# Patient Record
Sex: FEMALE | Race: ASIAN | NOT HISPANIC OR LATINO | URBAN - METROPOLITAN AREA
[De-identification: names, ages, dates, MRNs, and addresses within clinical notes are randomized per-mention and may not be internally consistent; named-entity substitution may affect disease eponyms.]

---

## 2022-12-09 ENCOUNTER — OFFICE (OUTPATIENT)
Dept: URBAN - METROPOLITAN AREA CLINIC 105 | Facility: CLINIC | Age: 42
End: 2022-12-09

## 2022-12-09 VITALS
HEIGHT: 64 IN | OXYGEN SATURATION: 98 % | HEART RATE: 85 BPM | SYSTOLIC BLOOD PRESSURE: 151 MMHG | DIASTOLIC BLOOD PRESSURE: 84 MMHG | WEIGHT: 172 LBS

## 2022-12-09 DIAGNOSIS — R10.12 LEFT UPPER QUADRANT PAIN: ICD-10-CM

## 2022-12-09 DIAGNOSIS — K21.9 GASTRO-ESOPHAGEAL REFLUX DISEASE WITHOUT ESOPHAGITIS: ICD-10-CM

## 2022-12-09 DIAGNOSIS — R93.3 ABNORMAL FINDINGS ON DIAGNOSTIC IMAGING OF OTHER PARTS OF DI: ICD-10-CM

## 2022-12-09 PROCEDURE — 99204 OFFICE O/P NEW MOD 45 MIN: CPT

## 2022-12-09 RX ORDER — SODIUM SULFATE, MAGNESIUM SULFATE, AND POTASSIUM CHLORIDE 17.75; 2.7; 2.25 G/1; G/1; G/1
TABLET ORAL
Qty: 1 | Refills: 0 | Status: ACTIVE
Start: 2022-12-09

## 2022-12-09 RX ORDER — OMEPRAZOLE 40 MG/1
CAPSULE, DELAYED RELEASE ORAL
Qty: 60 | Refills: 0 | Status: ACTIVE

## 2022-12-09 NOTE — SERVICEHPINOTES
Patient is a 42-year-old woman who presents for evaluation of left upper quadrant abdominal/flank pain. She reports first having this pain around her left side in 2018. At that time she was having burning sensation around her ribs. Her pain progressed and till it was occurring 24/7. Her pain became unbearable and was increasing in severity. She has CT abdomen/pelvis which showed inflammation in her stomach. She states that she changed her diet and started yoga which helped with her symptoms. Also is taking turmeric and fennel seed supplements. Symptoms worsened with turning, changing position and bending. Sometimes she has worsening symptoms with spicy food or greasy foods. She denies blood in stool or black stool. She does not take any blood thinners. She denies nausea/vomiting. She previously had heartburn and acid reflux but this improved with dietary changes. She denies dysphagia. She previously had odynophagia but this is resolved. She also has burning in her tongue after her COVID-vaccine. She denies family history of colon cancer, celiac disease or inflammatory bowel disease.br
brPatient was previously seen by Dr. Villatoro–GI 5/2017 for GERD and atypical chest pain. She had reportedly had a negative cardiac evaluation. She had tried over-the-counter and prescription acid suppression medications with good results. Symptoms have recurred when off those medications. Also with nausea and burning sensation in her chest. She had an EGD 6/2017 that was normal.Reviewed labs from 1/2022. Patient had H. pylori breath test that was negative. Ferritin 31, TIBC 293, iron 46 and percent sat 16. Folate and B12 within normal limits. CBC normal. CMP with glucose 110 but otherwise normal. Thyroid studies normal. Patient has CT abdomen/pelvis 9/2022 that showed circumferential mucosal thickening with enhancement of the antrum, 4 mm noncalcified pulmonary nodule of the left lung is unchanged since 2018 and benign and 7 mm left renal cyst. She had a CT chest without contrast 9/2022 with 4 mm noncalcified pulmonary nodule that is stable and benign. Patient was seen by her PCP Dr. Gong–9/2022 for bilateral flank pain that began on the right side but is worsening. Also with bubbling in the left upper quadrant. Some improvement with eating fennel seeds. Pain is mostly in the left upper quadrant and goes around torso. Also with increase in belching and progressively worsening symptoms over the last 1.5 years. Symptoms are worse with eating. Repeat CBC and CMP 9/2022 were normal.

## 2022-12-20 ENCOUNTER — OFFICE (OUTPATIENT)
Dept: URBAN - METROPOLITAN AREA PATHOLOGY 24 | Facility: PATHOLOGY | Age: 42
End: 2022-12-20

## 2022-12-20 ENCOUNTER — AMBULATORY SURGICAL CENTER (OUTPATIENT)
Dept: URBAN - METROPOLITAN AREA SURGERY 26 | Facility: SURGERY | Age: 42
End: 2022-12-20

## 2022-12-20 DIAGNOSIS — R10.12 LEFT UPPER QUADRANT PAIN: ICD-10-CM

## 2022-12-20 DIAGNOSIS — K29.50 UNSPECIFIED CHRONIC GASTRITIS WITHOUT BLEEDING: ICD-10-CM

## 2022-12-20 PROCEDURE — 43239 EGD BIOPSY SINGLE/MULTIPLE: CPT

## 2022-12-20 PROCEDURE — 88305 TISSUE EXAM BY PATHOLOGIST: CPT | Performed by: STUDENT IN AN ORGANIZED HEALTH CARE EDUCATION/TRAINING PROGRAM

## 2022-12-20 PROCEDURE — 88313 SPECIAL STAINS GROUP 2: CPT | Performed by: STUDENT IN AN ORGANIZED HEALTH CARE EDUCATION/TRAINING PROGRAM

## 2022-12-20 PROCEDURE — 88342 IMHCHEM/IMCYTCHM 1ST ANTB: CPT | Performed by: STUDENT IN AN ORGANIZED HEALTH CARE EDUCATION/TRAINING PROGRAM

## 2023-10-13 NOTE — SERVICENOTES
– Ordered upper endoscopy
– Ordered colonoscopy for colon cancer screening to be scheduled for 2/2023
– Sent in prescription for omeprazole 40 mg daily, 30 to 60 minutes before breakfast for 8 weeks no